# Patient Record
Sex: FEMALE | Race: BLACK OR AFRICAN AMERICAN | NOT HISPANIC OR LATINO
[De-identification: names, ages, dates, MRNs, and addresses within clinical notes are randomized per-mention and may not be internally consistent; named-entity substitution may affect disease eponyms.]

---

## 2018-05-29 ENCOUNTER — RECORDS - HEALTHEAST (OUTPATIENT)
Dept: ADMINISTRATIVE | Facility: OTHER | Age: 78
End: 2018-05-29

## 2018-06-05 ENCOUNTER — RECORDS - HEALTHEAST (OUTPATIENT)
Dept: ADMINISTRATIVE | Facility: OTHER | Age: 78
End: 2018-06-05

## 2018-06-19 ENCOUNTER — RECORDS - HEALTHEAST (OUTPATIENT)
Dept: ADMINISTRATIVE | Facility: OTHER | Age: 78
End: 2018-06-19

## 2018-06-21 ENCOUNTER — COMMUNICATION - HEALTHEAST (OUTPATIENT)
Dept: PULMONOLOGY | Facility: OTHER | Age: 78
End: 2018-06-21

## 2018-07-16 ENCOUNTER — OFFICE VISIT - HEALTHEAST (OUTPATIENT)
Dept: PULMONOLOGY | Facility: OTHER | Age: 78
End: 2018-07-16

## 2018-07-16 DIAGNOSIS — R91.1 LUNG NODULE: ICD-10-CM

## 2018-07-16 DIAGNOSIS — R07.89 CHEST WALL PAIN: ICD-10-CM

## 2018-07-16 RX ORDER — AMLODIPINE BESYLATE 5 MG/1
5 TABLET ORAL DAILY
Status: SHIPPED | COMMUNITY
Start: 2018-07-16

## 2018-07-16 ASSESSMENT — MIFFLIN-ST. JEOR: SCORE: 993.86

## 2021-06-01 VITALS — HEIGHT: 63 IN | WEIGHT: 124.5 LBS | BODY MASS INDEX: 22.06 KG/M2

## 2021-06-08 ENCOUNTER — RECORDS - HEALTHEAST (OUTPATIENT)
Dept: FAMILY MEDICINE | Facility: CLINIC | Age: 81
End: 2021-06-08

## 2021-06-08 DIAGNOSIS — R60.9 EDEMA, UNSPECIFIED: ICD-10-CM

## 2021-06-08 DIAGNOSIS — R22.2 LOCALIZED SWELLING, MASS AND LUMP, TRUNK: ICD-10-CM

## 2021-06-16 PROBLEM — M25.511 RIGHT SHOULDER PAIN: Status: ACTIVE | Noted: 2018-05-09

## 2021-06-19 NOTE — PROGRESS NOTES
Assessment and Plan:Elvira Abernathy is a 77 y.o. with a past medical history significant for hypertension who presents to clinic today for evaluation of a lung nodule.  She thought she was being referred for a painful chest wall nodule that has been present for 3-4 months.  In the workup of this, a lung nodule was found as well.  The lung nodule is >1cm but may have calcium in it.  She is also a non smoker, so I suspect this is benign.  I will repeat a CT in 6 months to evaluate this.  She also had a micronodular process in her RUL, but no active symptoms of an atypical infection.  I will start by ruling out TB with a quantiferon test before we dig too deeply into this problem.  It is not clear if the sternoclavicular joint issue is related to her lung processes or not, however cancer, TB, and rheumatoid arthritis can all give these clinical pictures.   1. Chest wall mass - ketoprofen topical may help cut down on the inflammation.  Rheumatology referral for evaluation and consideration of FNA.  Will send a RF panel when she gets her quant drawn.  2. Lung nodule - suspect benign, will repeat CT in 6 months.    3. Micronodules in RUL - possibly an older atypical infection or TB, asymptomatic.  Will send quantiferon and follow expectantly for now      CCx: chest wall pain    HPI: Ms. Abernathy is a 77 year old female from [place] who presents to discuss a tender spot on her chest wall. She was referred by the [facility] for a lung nodule that was discovered in the workup of the tender spot.  She has had the tender spot for 3-4 months and it seems to be slowly getting bigger, and more painful.  She can't raise her right arm above her head without the pain coming on.  It radiates up the back of her head.  She has no other nodules or spots on her skin.    She has had dizziness as well for the last year.  She was recently started on amlodipine for her high blood pressure.  She has no pulmonary symptoms of any kind.  She  is a never smoker.  She insists she did not have TB, but has not been screened for it upon coming to the [place].  She plans to go back and forth to [place] to stay with family.  She denies fevers, chills or night sweats. She says she has rheumatoid arthritis in her legs.    PMH:  No past medical history on file.    PSH:  No past surgical history on file.    SH:  Social History     Social History     Marital status:      Spouse name: N/A     Number of children: N/A     Years of education: N/A     Occupational History     Not on file.     Social History Main Topics     Smoking status: Not on file     Smokeless tobacco: Not on file     Alcohol use Not on file     Drug use: Not on file     Sexual activity: Not on file     Other Topics Concern     Not on file     Social History Narrative     Never smoked    Family history:  The family history was reviewed and is not pertinent to the chief complaint or HPI.    ROS:  Review of Systems - History obtained from the patient  General ROS: negative  Psychological ROS: negative  ENT ROS: negative  Allergy and Immunology ROS: negative  Endocrine ROS: negative  Respiratory ROS:   negative for - cough, hemoptysis, orthopnea, pleuritic pain, shortness of breath, sputum changes, stridor, tachypnea or wheezing  Cardiovascular ROS: no chest pain or palpitations  Gastrointestinal ROS: no abdominal pain, change in bowel habits, or black or bloody stools  Genito-Urinary ROS: no dysuria, trouble voiding, or hematuria  Musculoskeletal ROS: chest wall pain as above, leg pains  Neurological ROS: no TIA or stroke symptoms - dizziness  Dermatological ROS: negative      Current Meds:  Current Outpatient Prescriptions   Medication Sig Note     amLODIPine (NORVASC) 5 MG tablet Take 5 mg by mouth daily. 7/16/2018: Patient takes 7.5mg daily      cyclobenzaprine (FLEXERIL) 10 MG tablet Take 0.5 tablets (5 mg total) by mouth 3 (three) times a day as needed for muscle spasms.      ketoprofen,  "micronized 10 % cmap Apply 1 mL topically 2 (two) times a day.        Labs:  No results found for this or any previous visit (from the past 72 hour(s)).    I have personally reviewed all imaging and PFT data available pertinent to this visit.    Imaging studies:  Xr Chest 2 Views    Result Date: 5/9/2018  XR CHEST 2 VIEWS 5/9/2018 5:20 PM INDICATION: Right shoulder/chest pain COMPARISON: None. FINDINGS: No pleural fluid or pneumothorax. There are opacities in the lateral right upper lobe on the frontal view which could represent airspace disease. Interstitial edema is noted. The heart is enlarged. Degenerative osseous changes are present. NOTE: ABNORMAL REPORT THE DICTATION ABOVE DESCRIBES AN ABNORMALITY FOR WHICH FOLLOW-UP IS NEEDED.       PFTs:  none    Physical Exam:  /68  Pulse 76  Resp 18  Ht 5' 3\" (1.6 m)  Wt 124 lb 8 oz (56.5 kg)  SpO2 100%  BMI 22.05 kg/m2  General - Well nourished  Ears/Mouth - OP pink moist, no thrush  Neck - no cervical lymphadenopathy  Chest wall - swollen, tender, spongy right sternoclavicular joint.  Pain on movement of this joint in any direction.  Lungs - Clear to ausculation bilaterally   CVS - regular rhythm with no murmurs, rubs or gallups  Abdomen - soft, NT, ND, NABS  Ext - no cyanosis, clubbing or edema  Skin - no rash  Psychology - alert and oriented, answers appropriate        Electronically signed by:    Yash Villalta MD PhD  [facility]  "

## 2021-07-03 NOTE — ADDENDUM NOTE
Addendum Note by Yash Villalta MD at 7/17/2018  8:40 AM     Author: Yash Villalta MD Service: -- Author Type: Physician    Filed: 7/17/2018  8:40 AM Encounter Date: 7/16/2018 Status: Signed    : Yash Villalta MD (Physician)    Addended by: YASH VILLALTA on: 7/17/2018 08:40 AM        Modules accepted: Orders